# Patient Record
Sex: MALE | Race: WHITE | NOT HISPANIC OR LATINO | Employment: OTHER | URBAN - METROPOLITAN AREA
[De-identification: names, ages, dates, MRNs, and addresses within clinical notes are randomized per-mention and may not be internally consistent; named-entity substitution may affect disease eponyms.]

---

## 2019-03-11 ENCOUNTER — APPOINTMENT (OUTPATIENT)
Dept: GENERAL RADIOLOGY | Facility: HOSPITAL | Age: 74
End: 2019-03-11

## 2019-03-11 ENCOUNTER — APPOINTMENT (OUTPATIENT)
Dept: CT IMAGING | Facility: HOSPITAL | Age: 74
End: 2019-03-11

## 2019-03-11 ENCOUNTER — HOSPITAL ENCOUNTER (EMERGENCY)
Facility: HOSPITAL | Age: 74
Discharge: HOME | End: 2019-03-11
Attending: EMERGENCY MEDICINE | Admitting: EMERGENCY MEDICINE

## 2019-03-11 VITALS
DIASTOLIC BLOOD PRESSURE: 115 MMHG | BODY MASS INDEX: 27.77 KG/M2 | TEMPERATURE: 96.7 F | WEIGHT: 216.4 LBS | OXYGEN SATURATION: 95 % | HEART RATE: 76 BPM | SYSTOLIC BLOOD PRESSURE: 165 MMHG | RESPIRATION RATE: 20 BRPM | HEIGHT: 74 IN

## 2019-03-11 DIAGNOSIS — R09.1 PLEURISY: Primary | ICD-10-CM

## 2019-03-11 LAB
ALBUMIN SERPL-MCNC: 4.8 G/DL (ref 3.5–5.2)
ALBUMIN/GLOB SERPL: 1.5 G/DL
ALP SERPL-CCNC: 47 U/L (ref 39–117)
ALT SERPL W P-5'-P-CCNC: 23 U/L (ref 1–41)
ANION GAP SERPL CALCULATED.3IONS-SCNC: 13.4 MMOL/L
AST SERPL-CCNC: 16 U/L (ref 1–40)
BASOPHILS # BLD AUTO: 0.02 10*3/MM3 (ref 0–0.2)
BASOPHILS NFR BLD AUTO: 0.2 % (ref 0–1.5)
BILIRUB SERPL-MCNC: 1 MG/DL (ref 0.1–1.2)
BUN BLD-MCNC: 14 MG/DL (ref 8–23)
BUN/CREAT SERPL: 14.9 (ref 7–25)
CALCIUM SPEC-SCNC: 9.9 MG/DL (ref 8.6–10.5)
CHLORIDE SERPL-SCNC: 98 MMOL/L (ref 98–107)
CO2 SERPL-SCNC: 26.6 MMOL/L (ref 22–29)
CREAT BLD-MCNC: 0.94 MG/DL (ref 0.76–1.27)
DEPRECATED RDW RBC AUTO: 42 FL (ref 37–54)
EOSINOPHIL # BLD AUTO: 0.02 10*3/MM3 (ref 0–0.4)
EOSINOPHIL NFR BLD AUTO: 0.2 % (ref 0.3–6.2)
ERYTHROCYTE [DISTWIDTH] IN BLOOD BY AUTOMATED COUNT: 12.8 % (ref 12.3–15.4)
GFR SERPL CREATININE-BSD FRML MDRD: 78 ML/MIN/1.73
GFR SERPL CREATININE-BSD FRML MDRD: 95 ML/MIN/1.73
GLOBULIN UR ELPH-MCNC: 3.2 GM/DL
GLUCOSE BLD-MCNC: 126 MG/DL (ref 65–99)
HCT VFR BLD AUTO: 46 % (ref 37.5–51)
HGB BLD-MCNC: 15.3 G/DL (ref 13–17.7)
IMM GRANULOCYTES # BLD AUTO: 0.05 10*3/MM3 (ref 0–0.05)
IMM GRANULOCYTES NFR BLD AUTO: 0.4 % (ref 0–0.5)
LYMPHOCYTES # BLD AUTO: 1.82 10*3/MM3 (ref 0.7–3.1)
LYMPHOCYTES NFR BLD AUTO: 16 % (ref 19.6–45.3)
MCH RBC QN AUTO: 29.8 PG (ref 26.6–33)
MCHC RBC AUTO-ENTMCNC: 33.3 G/DL (ref 31.5–35.7)
MCV RBC AUTO: 89.5 FL (ref 79–97)
MONOCYTES # BLD AUTO: 0.66 10*3/MM3 (ref 0.1–0.9)
MONOCYTES NFR BLD AUTO: 5.8 % (ref 5–12)
NEUTROPHILS # BLD AUTO: 8.81 10*3/MM3 (ref 1.4–7)
NEUTROPHILS NFR BLD AUTO: 77.4 % (ref 42.7–76)
NRBC BLD AUTO-RTO: 0 /100 WBC (ref 0–0)
NT-PROBNP SERPL-MCNC: 472.1 PG/ML (ref 5–900)
PLATELET # BLD AUTO: 205 10*3/MM3 (ref 140–450)
PMV BLD AUTO: 9.2 FL (ref 6–12)
POTASSIUM BLD-SCNC: 4.4 MMOL/L (ref 3.5–5.2)
PROT SERPL-MCNC: 8 G/DL (ref 6–8.5)
RBC # BLD AUTO: 5.14 10*6/MM3 (ref 4.14–5.8)
SODIUM BLD-SCNC: 138 MMOL/L (ref 136–145)
TROPONIN T SERPL-MCNC: <0.01 NG/ML (ref 0–0.03)
WBC NRBC COR # BLD: 11.38 10*3/MM3 (ref 3.4–10.8)

## 2019-03-11 PROCEDURE — 84484 ASSAY OF TROPONIN QUANT: CPT | Performed by: EMERGENCY MEDICINE

## 2019-03-11 PROCEDURE — 71275 CT ANGIOGRAPHY CHEST: CPT

## 2019-03-11 PROCEDURE — 80053 COMPREHEN METABOLIC PANEL: CPT | Performed by: EMERGENCY MEDICINE

## 2019-03-11 PROCEDURE — 85025 COMPLETE CBC W/AUTO DIFF WBC: CPT | Performed by: EMERGENCY MEDICINE

## 2019-03-11 PROCEDURE — 0 IOPAMIDOL PER 1 ML: Performed by: NURSE PRACTITIONER

## 2019-03-11 PROCEDURE — 99282 EMERGENCY DEPT VISIT SF MDM: CPT

## 2019-03-11 PROCEDURE — 93005 ELECTROCARDIOGRAM TRACING: CPT | Performed by: EMERGENCY MEDICINE

## 2019-03-11 PROCEDURE — 93010 ELECTROCARDIOGRAM REPORT: CPT | Performed by: INTERNAL MEDICINE

## 2019-03-11 PROCEDURE — 93005 ELECTROCARDIOGRAM TRACING: CPT

## 2019-03-11 PROCEDURE — 83880 ASSAY OF NATRIURETIC PEPTIDE: CPT | Performed by: EMERGENCY MEDICINE

## 2019-03-11 PROCEDURE — 71046 X-RAY EXAM CHEST 2 VIEWS: CPT

## 2019-03-11 PROCEDURE — 36415 COLL VENOUS BLD VENIPUNCTURE: CPT

## 2019-03-11 RX ORDER — NAPROXEN 500 MG/1
500 TABLET ORAL 2 TIMES DAILY PRN
Qty: 14 TABLET | Refills: 0 | Status: SHIPPED | OUTPATIENT
Start: 2019-03-11

## 2019-03-11 RX ORDER — BENZONATATE 100 MG/1
100 CAPSULE ORAL 3 TIMES DAILY PRN
Qty: 21 CAPSULE | Refills: 0 | Status: SHIPPED | OUTPATIENT
Start: 2019-03-11

## 2019-03-11 RX ADMIN — IOPAMIDOL 95 ML: 755 INJECTION, SOLUTION INTRAVENOUS at 18:41

## 2019-03-11 NOTE — ED NOTES
Pt reports sharp pain in left chest/back with inspiration x 6 days ago.      Farideh Real, RN  03/11/19 5407

## 2019-03-11 NOTE — ED PROVIDER NOTES
"EMERGENCY DEPARTMENT ENCOUNTER    CHIEF COMPLAINT  Chief Complaint: Left sided back pain   History given by:pt  History limited by:none  Time Seen: 1756  Room Number: 29/29  PMD: Provider, No Known      HPI:  Pt is a 73 y.o. male who presents with left sided upper back pain on inspiration that started 6 days ago. Pt admits to productive cough with yellow sputum and subjective fever but denies chest pain, SOA, N/V/D, chills, or congestion.    Duration: six days  Timing: intermittent  Location:left sided upper back  Radiation:none  Quality:\"pain\"  Intensity/Severity:moderate  Progression:unchanged  Associated Symptoms:productive cough with yellow sputum, subjective fever  Aggravating Factors:none  Alleviating Factors:none  Previous Episodes:none  Treatment before arrival:none    PAST MEDICAL HISTORY  Active Ambulatory Problems     Diagnosis Date Noted   • No Active Ambulatory Problems     Resolved Ambulatory Problems     Diagnosis Date Noted   • No Resolved Ambulatory Problems     Past Medical History:   Diagnosis Date   • Hernia, abdominal        PAST SURGICAL HISTORY  History reviewed. No pertinent surgical history.    FAMILY HISTORY  History reviewed. No pertinent family history.    SOCIAL HISTORY  Social History     Socioeconomic History   • Marital status: Single     Spouse name: Not on file   • Number of children: Not on file   • Years of education: Not on file   • Highest education level: Not on file   Social Needs   • Financial resource strain: Not on file   • Food insecurity - worry: Not on file   • Food insecurity - inability: Not on file   • Transportation needs - medical: Not on file   • Transportation needs - non-medical: Not on file   Occupational History   • Not on file   Tobacco Use   • Smoking status: Never Smoker   Substance and Sexual Activity   • Alcohol use: No     Frequency: Never   • Drug use: No   • Sexual activity: Not on file   Other Topics Concern   • Not on file   Social History Narrative "   • Not on file         ALLERGIES  Patient has no known allergies.    REVIEW OF SYSTEMS  Review of Systems   Constitutional: Positive for fever (subjective). Negative for activity change, appetite change ( decreased) and chills.   HENT: Negative for congestion, ear pain, rhinorrhea, sinus pressure and sore throat.    Eyes: Negative.    Respiratory: Positive for cough (productive with yellow sputum). Negative for shortness of breath.    Cardiovascular: Negative.  Negative for chest pain, palpitations and leg swelling ( pedal).   Gastrointestinal: Negative for abdominal pain, diarrhea, nausea and vomiting.   Endocrine: Negative.    Genitourinary: Negative.  Negative for decreased urine volume, difficulty urinating, dysuria, frequency and urgency.   Musculoskeletal: Positive for back pain (left sided upper; on inspiration).   Skin: Negative.  Negative for rash.   Allergic/Immunologic: Negative.    Neurological: Negative.  Negative for dizziness, weakness, light-headedness, numbness and headaches.   Hematological: Negative.    Psychiatric/Behavioral: Negative.  The patient is not nervous/anxious.    All other systems reviewed and are negative.      PHYSICAL EXAM  ED Triage Vitals   Temp Heart Rate Resp BP SpO2   03/11/19 1438 03/11/19 1438 03/11/19 1438 03/11/19 1644 03/11/19 1438   96.7 °F (35.9 °C) 76 20 163/95 95 %      Temp src Heart Rate Source Patient Position BP Location FiO2 (%)   03/11/19 1438 -- 03/11/19 1644 03/11/19 1644 --   Tympanic  Sitting Left arm        Physical Exam   Constitutional: He is well-developed, well-nourished, and in no distress. No distress.   HENT:   Head: Normocephalic.   Mouth/Throat: Oropharynx is clear and moist and mucous membranes are normal.   Eyes: Pupils are equal, round, and reactive to light.   Neck: Normal range of motion.   Cardiovascular: Normal rate, regular rhythm and normal heart sounds.   Pulmonary/Chest: Effort normal and breath sounds normal. He has no wheezes.    Abdominal: Soft. Bowel sounds are normal. There is no tenderness.   Musculoskeletal: Normal range of motion. He exhibits no edema.   Neurological: He is alert.   Skin: Skin is warm and dry. No rash noted.   Psychiatric: Mood, memory, affect and judgment normal.   Nursing note and vitals reviewed.      LAB RESULTS  Recent Results (from the past 24 hour(s))   Comprehensive Metabolic Panel    Collection Time: 03/11/19  4:46 PM   Result Value Ref Range    Glucose 126 (H) 65 - 99 mg/dL    BUN 14 8 - 23 mg/dL    Creatinine 0.94 0.76 - 1.27 mg/dL    Sodium 138 136 - 145 mmol/L    Potassium 4.4 3.5 - 5.2 mmol/L    Chloride 98 98 - 107 mmol/L    CO2 26.6 22.0 - 29.0 mmol/L    Calcium 9.9 8.6 - 10.5 mg/dL    Total Protein 8.0 6.0 - 8.5 g/dL    Albumin 4.80 3.50 - 5.20 g/dL    ALT (SGPT) 23 1 - 41 U/L    AST (SGOT) 16 1 - 40 U/L    Alkaline Phosphatase 47 39 - 117 U/L    Total Bilirubin 1.0 0.1 - 1.2 mg/dL    eGFR Non African Amer 78 >60 mL/min/1.73    eGFR  African Amer 95 >60 mL/min/1.73    Globulin 3.2 gm/dL    A/G Ratio 1.5 g/dL    BUN/Creatinine Ratio 14.9 7.0 - 25.0    Anion Gap 13.4 mmol/L   BNP    Collection Time: 03/11/19  4:46 PM   Result Value Ref Range    proBNP 472.1 5.0 - 900.0 pg/mL   Troponin    Collection Time: 03/11/19  4:46 PM   Result Value Ref Range    Troponin T <0.010 0.000 - 0.030 ng/mL   CBC Auto Differential    Collection Time: 03/11/19  4:46 PM   Result Value Ref Range    WBC 11.38 (H) 3.40 - 10.80 10*3/mm3    RBC 5.14 4.14 - 5.80 10*6/mm3    Hemoglobin 15.3 13.0 - 17.7 g/dL    Hematocrit 46.0 37.5 - 51.0 %    MCV 89.5 79.0 - 97.0 fL    MCH 29.8 26.6 - 33.0 pg    MCHC 33.3 31.5 - 35.7 g/dL    RDW 12.8 12.3 - 15.4 %    RDW-SD 42.0 37.0 - 54.0 fl    MPV 9.2 6.0 - 12.0 fL    Platelets 205 140 - 450 10*3/mm3    Neutrophil % 77.4 (H) 42.7 - 76.0 %    Lymphocyte % 16.0 (L) 19.6 - 45.3 %    Monocyte % 5.8 5.0 - 12.0 %    Eosinophil % 0.2 (L) 0.3 - 6.2 %    Basophil % 0.2 0.0 - 1.5 %    Immature Grans %  "0.4 0.0 - 0.5 %    Neutrophils, Absolute 8.81 (H) 1.40 - 7.00 10*3/mm3    Lymphocytes, Absolute 1.82 0.70 - 3.10 10*3/mm3    Monocytes, Absolute 0.66 0.10 - 0.90 10*3/mm3    Eosinophils, Absolute 0.02 0.00 - 0.40 10*3/mm3    Basophils, Absolute 0.02 0.00 - 0.20 10*3/mm3    Immature Grans, Absolute 0.05 0.00 - 0.05 10*3/mm3    nRBC 0.0 0.0 - 0.0 /100 WBC       I ordered the above labs and reviewed the results    RADIOLOGY  CT Angiogram Chest With Contrast   Final Result       No pulmonary embolism. Follow-up as indications persist.       This report was finalized on 3/11/2019 7:26 PM by Dr. Nitish Martinez M.D.          XR Chest 2 View   IMPRESSIONS: No evidence of acute disease within the chest.          I ordered the above noted radiological studies and reviewed the images on the PACS system.  Spoke with Dr. Martinez regarding CT scan results        EKG    ekg was interpreted by Dr. Cevallos.      PROGRESS AND CONSULTS  2018  Reviewed pt's history and workup with Dr. Cevallos.  After a bedside evaluation; Dr Cevallos agrees with the plan of care    2219  Placed call to pt's home phone. Discussed with the pt that afib was seen on the monitor while the pt was in the ED. Discussed that the pt will need to follow up with cardiology tomorrow. Gave the pt the phone number to contact cardiology tomorrow morning.     COURSE & MEDICAL DECISION MAKING  Pertinent Labs and Imaging studies that were ordered and reviewed are noted above.  Results were reviewed/discussed with the patient and they were also made aware of online assess.   Pt also made aware that some labs, such as cultures, will not be resulted during ER visit and follow up with PMD is necessary.     MEDICATIONS GIVEN IN ER  Medications   iopamidol (ISOVUE-370) 76 % injection 100 mL (95 mL Intravenous Given by Other 3/11/19 1841)       BP (!) 165/115   Pulse 76   Temp 96.7 °F (35.9 °C) (Tympanic)   Resp 20   Ht 188 cm (74\")   Wt 98.2 kg (216 lb 6.4 oz)   " SpO2 95%   BMI 27.78 kg/m²     Discussed all results and noted any abnormalities with patient.     Reviewed implications of results, diagnosis, meds, responsibility to follow up, warning signs and symptoms of possible worsening, potential complications and reasons to return to ER with patient    Discussed plan for discharge, as there is no emergent indication for admission.  Pt is agreeable and understands need for follow up and repeat testing.  Pt is aware that discharge does not mean that nothing is wrong but it indicates no emergency is present and they must continue care with PCP.  Pt is discharged with instructions to follow up with primary care doctor to have their blood pressure rechecked.       DIAGNOSIS  Final diagnoses:   Pleurisy       FOLLOW UP   [facility] [address]  [phone]  Schedule an appointment as soon as possible for a visit         RX     Medication List      New Prescriptions    benzonatate 100 MG capsule  Commonly known as:  TESSALON  Take 1 capsule by mouth 3 (Three) Times a Day As Needed for Cough.     naproxen 500 MG tablet  Commonly known as:  NAPROSYN  Take 1 tablet by mouth 2 (Two) Times a Day As Needed for Moderate Pain .          I personally reviewed the past medical history, past surgical history, social history, family history, current medications and allergies as they appear in this chart.  The scribe's note accurately reflects the work and decisions made by me.           Documentation assistance provided by brett Treadwell for ROXI Peterson.  Information recorded by the scribe was done at my direction and has been verified and validated by me.         Nori Treadwell  03/11/19 2220       Christine Curran APRN  03/11/19 0479

## 2019-03-12 NOTE — ED PROVIDER NOTES
Pt is a 73 y.o. male who presents to the ED complaining of sharp pain in left chest with deep inspiration for the past 6 days.    On exam,  Constitutional: NAD  Cardiovascular: heart is RRR  Pulmonary: lungs CTAB  Abdomen: soft, non-tender, non-distended    Labs and imaging reviewed.   CTA Chest negative acute. BNP-472; troponin<0.01    EKG          EKG time: 1443  Rhythm/Rate: a-fib, 130  P waves and IN: afib  QRS, axis: Nml   ST and T waves: repol changes in diffuse leads     Interpreted Contemporaneously by me, independently viewed  No prior      Plan: Discharge with Rx for Tessalon and Naproxen    ED Course as of Mar 11 2203   Mon Mar 11, 2019   2145 9:46 PM  EKG woth atrial fibrillation (HR improved prior to DC).  CHADS-VASC = 1.  APRN to call patient and inform of need for cardiology follow up.  [WC]      ED Course User Index  [WC] Jame Cevallos MD MD ATTESTATION NOTE    The JOSE DAVID and I have discussed this patient's history, physical exam, and treatment plan.  I have reviewed the documentation and personally had a face to face interaction with the patient. I affirm the documentation and agree with the treatment and plan.  The attached note describes my personal findings.      Documentation assistance provided by brett Wyatt for Dr. Cevallos. Information recorded by the brett was done at my direction and has been verified and validated by me.                    Kishan Wyatt  03/11/19 2203       Jame Cevallos MD  03/11/19 3622

## 2023-03-27 ENCOUNTER — OFFICE VISIT (OUTPATIENT)
Dept: INTERNAL MEDICINE | Facility: CLINIC | Age: 78
End: 2023-03-27
Payer: MEDICARE

## 2023-03-27 VITALS
SYSTOLIC BLOOD PRESSURE: 140 MMHG | RESPIRATION RATE: 16 BRPM | OXYGEN SATURATION: 97 % | BODY MASS INDEX: 25.8 KG/M2 | HEIGHT: 74 IN | DIASTOLIC BLOOD PRESSURE: 90 MMHG | TEMPERATURE: 97.5 F | WEIGHT: 201 LBS | HEART RATE: 120 BPM

## 2023-03-27 DIAGNOSIS — Z00.00 HEALTHCARE MAINTENANCE: ICD-10-CM

## 2023-03-27 DIAGNOSIS — T39.91XA: ICD-10-CM

## 2023-03-27 DIAGNOSIS — R03.0 ELEVATED BP WITHOUT DIAGNOSIS OF HYPERTENSION: ICD-10-CM

## 2023-03-27 DIAGNOSIS — F22 PARANOIA: Primary | ICD-10-CM

## 2023-03-27 NOTE — PROGRESS NOTES
"Chief Complaint  Establish Care (Suspects he's been poisoned from home being compromised. )    Subjective          Johnson Wise presents to [facility]  History of Present Illness     The patient presents at today's office visit as a new patient.    The patient would like to get tested to see if he has been poisoned. He called poison control and they took his name and told him we could call them to find out what tests need to be done.    A couple of years ago, he was sitting in his living room watching TV and his feet felt like someone had poured scalding hot water on his feet. It did not happen again. He saw children on the news who had been poisoned by thallium and had the same symptoms.    He fortified the locks on his home, but believes at least one of them has been compromised. He believes that someone is poisoning him. He feels \"woozy,\" and his gums bleed a little bit. He states he is \"quite sure he has been poisoned.\" Today he received mail stating that he needed to go to a doctor and will pay him $ 50. He thinks he is poisoned by rat poison. He is installing cameras at his home. The patient states they will not get in again.    When informed that his blood pressure is elevated today, the patient states \"It was perfect. That's...what I'm saying, that's that poison.\"    Several years ago, he slipped in the tub on 2 pieces of soap and fractured a rib. Lately, it feels like someone stuck a thorn in his side. He states he needs an MRI.    Review of Systems was performed, and pertinent findings are noted in the HPI.    Objective   Vital Signs:   /90   Pulse 120   Temp 97.5 °F (36.4 °C)   Resp 16   Ht 188 cm (74\")   Wt 91.2 kg (201 lb)   SpO2 97%   BMI 25.81 kg/m²     Physical Exam  Vitals and nursing note reviewed.   Constitutional:       Appearance: He is well-developed.   HENT:      Head: Normocephalic and atraumatic.   Musculoskeletal:      Cervical back: Normal " "range of motion and neck supple.   Neurological:      Mental Status: He is alert and oriented to person, place, and time.   Psychiatric:         Behavior: Behavior normal.        Result Review :                 Assessment and Plan    Diagnoses and all orders for this visit:    1. Paranoia (Primary)  -     Ambulatory Referral to Occupational Medicine    2. Accidental poisoning by non-narcotic analgesic, initial encounter  -     Comprehensive Metabolic Panel  -     CBC & Differential  -     Ambulatory Referral to Occupational Medicine    3. Elevated BP without diagnosis of hypertension    4. Healthcare maintenance    1.Elevated blood pressure  - CBC and CMP ordered.    2. Possible poisoning  - I will refer him to occupational medicine. Patient is \"pretty sure\" that this is not in his \"head.\"  He is not entertaining that he may have some underlying paranoia.     3. Rib pain  - We will consider imaging in the future.        Follow Up   No follow-ups on file.   Follow up in 2 months.    Patient was given instructions and counseling regarding his condition or for health maintenance advice. Please see specific information pulled into the AVS if appropriate.         Transcribed from ambient dictation for Larry Sousa MD by Martina Davila.  03/27/23   13:29 EDT    Patient or patient representative verbalized consent to the visit recording.  I have personally performed the services described in this document as transcribed by the above individual, and it is both accurate and complete.          "

## 2023-03-30 ENCOUNTER — TELEPHONE (OUTPATIENT)
Dept: INTERNAL MEDICINE | Facility: CLINIC | Age: 78
End: 2023-03-30

## 2023-03-30 NOTE — TELEPHONE ENCOUNTER
Caller: Johnson Wise    Relationship to patient: Self    Best call back number: [phone]    Patient is needing: PATIENT STATES THAT HE SAW DR. KUNZ FOR HIS THINKING THAT HE HAD POISON/CARCINOGEN IN HIS BODY.   HE STATES HE WAS TAKING A HEADACHE MEDICINE THAT HAD CAFFEINE IN IT.   HE STATES HE IS NOT IN CONTACT WITH DRUG DEALERS, BUT THAT HE CAN POSSIBLY LOCATE PENICILLIN ON HIS OWN, AND PATIENT IS REQUESTING THAT WOULD LIKE DR. KUNZ TO PRESCRIBE HIM PENICILLIN BECAUSE HE NOW THINKS YOU HAVE SYPHYLLIS AND IT IS AFFECTING HIS MUSCULAR SYSTEM, BUT THEN AGAIN, IT MIGHT BE THE POISON.  PATIENT WOULD LIKE DR. KUNZ'S THOUGHTS ON GIVING HIM AN INJECTION OF [id number] UNITS OF BENADICTINE PENICILLIN.    PLEASE CALL THE PATIENT TO ADVISE.       PATIENT IS ALSO WAITING ON DR. KUNZ TO GET THE POISON TEST.

## 2023-03-31 ENCOUNTER — PATIENT ROUNDING (BHMG ONLY) (OUTPATIENT)
Dept: INTERNAL MEDICINE | Facility: CLINIC | Age: 78
End: 2023-03-31
Payer: MEDICARE

## 2023-03-31 NOTE — PROGRESS NOTES
March 31, 2023    Hello, may I speak with Johnson Wise?    My name is  JACOBY    I am  with [facility]  [address]  [place] [address] [phone].    Before we get started may I verify your date of birth? 1945    I am calling to officially welcome you to our practice and ask about your recent visit. Is this a good time to talk? YES  Tell me about your visit with us. What things went well?  YES       We're always looking for ways to make our patients' experiences even better. Do you have recommendations on ways we may improve?  NO    Overall were you satisfied with your first visit to our practice? YES     I appreciate you taking the time to speak with me today. Is there anything else I can do for you? NO      Thank you, and have a great day.

## 2023-04-05 NOTE — TELEPHONE ENCOUNTER
PATIENT CALLING BACK IN REGARDS TO POISON IN HIS BODY. HE HAS NOT HEARD ANYTHING ABOUT WHERE TO GO     PLEASE CALL AND ADVISE [phone]    PLEASE LEAVE A VOICEMAIL IF NO ANSWER

## 2023-04-12 ENCOUNTER — TELEPHONE (OUTPATIENT)
Dept: INTERNAL MEDICINE | Facility: CLINIC | Age: 78
End: 2023-04-12

## 2023-04-12 DIAGNOSIS — Z72.51 HIGH RISK HETEROSEXUAL BEHAVIOR: Primary | ICD-10-CM

## 2023-04-12 NOTE — TELEPHONE ENCOUNTER
I have ordered a syphilis RPR.  He will need to get this done in order to see if he has syphilis.  If he does have syphilis then I can give him the antibiotics.

## 2023-04-12 NOTE — TELEPHONE ENCOUNTER
Telephone with Larry Sousa MD (03/30/2023)    TELEPHONE ENCOUNTER ON 3/30/23    Caller: Johnson Wise     Relationship to patient: Self     Best call back number:      Patient is needing: PATIENT STATES THAT HE SAW DR. SOUSA FOR HIS THINKING THAT HE HAD POISON/CARCINOGEN IN HIS BODY.   HE STATES HE WAS TAKING A HEADACHE MEDICINE THAT HAD CAFFEINE IN IT.   HE STATES HE IS NOT IN CONTACT WITH DRUG DEALERS, BUT THAT HE CAN POSSIBLY LOCATE PENICILLIN ON HIS OWN, AND PATIENT IS REQUESTING THAT WOULD LIKE DR. SOUSA TO PRESCRIBE HIM PENICILLIN BECAUSE HE NOW THINKS YOU HAVE SYPHYLLIS AND IT IS AFFECTING HIS MUSCULAR SYSTEM, BUT THEN AGAIN, IT MIGHT BE THE POISON.  PATIENT WOULD LIKE DR. SOUSA'S THOUGHTS ON GIVING HIM AN INJECTION OF [id number] UNITS OF BENADICTINE PENICILLIN.    PLEASE CALL THE PATIENT TO ADVISE.        PATIENT IS ALSO WAITING ON DR. SOUSA TO GET THE POISON TEST.

## 2023-04-13 ENCOUNTER — TELEPHONE (OUTPATIENT)
Dept: INTERNAL MEDICINE | Facility: CLINIC | Age: 78
End: 2023-04-13

## 2023-04-13 NOTE — TELEPHONE ENCOUNTER
Spoke to patient and told her she does not need to schedule labs she can walk in.      HUB TO READ

## 2023-04-13 NOTE — TELEPHONE ENCOUNTER
Hub staff attempted to follow warm transfer process and was unsuccessful     Caller: STEVIE    Relationship to patient: Other    Best call back number: [phone]    Patient is needing: STEVIE CALLED IN TO HELP THE PATIENT SCHEDULE A LAB APPOINTMENT. PLEASE ADVISE.

## 2023-05-19 ENCOUNTER — LAB (OUTPATIENT)
Dept: LAB | Facility: HOSPITAL | Age: 78
End: 2023-05-19
Payer: MEDICARE

## 2023-05-19 LAB
ALBUMIN SERPL-MCNC: 4.5 G/DL (ref 3.5–5.2)
ALBUMIN/GLOB SERPL: 1.7 G/DL
ALP SERPL-CCNC: 42 U/L (ref 39–117)
ALT SERPL W P-5'-P-CCNC: 15 U/L (ref 1–41)
ANION GAP SERPL CALCULATED.3IONS-SCNC: 7.3 MMOL/L (ref 5–15)
AST SERPL-CCNC: 16 U/L (ref 1–40)
BASOPHILS # BLD AUTO: 0.03 10*3/MM3 (ref 0–0.2)
BASOPHILS NFR BLD AUTO: 0.4 % (ref 0–1.5)
BILIRUB SERPL-MCNC: 0.4 MG/DL (ref 0–1.2)
BUN SERPL-MCNC: 19 MG/DL (ref 8–23)
BUN/CREAT SERPL: 21.3 (ref 7–25)
CALCIUM SPEC-SCNC: 9.8 MG/DL (ref 8.6–10.5)
CHLORIDE SERPL-SCNC: 102 MMOL/L (ref 98–107)
CO2 SERPL-SCNC: 28.7 MMOL/L (ref 22–29)
CREAT SERPL-MCNC: 0.89 MG/DL (ref 0.76–1.27)
DEPRECATED RDW RBC AUTO: 42.8 FL (ref 37–54)
EGFRCR SERPLBLD CKD-EPI 2021: 87.2 ML/MIN/1.73
EOSINOPHIL # BLD AUTO: 0.15 10*3/MM3 (ref 0–0.4)
EOSINOPHIL NFR BLD AUTO: 1.9 % (ref 0.3–6.2)
ERYTHROCYTE [DISTWIDTH] IN BLOOD BY AUTOMATED COUNT: 13.2 % (ref 12.3–15.4)
GLOBULIN UR ELPH-MCNC: 2.7 GM/DL
GLUCOSE SERPL-MCNC: 117 MG/DL (ref 65–99)
HCT VFR BLD AUTO: 41.9 % (ref 37.5–51)
HGB BLD-MCNC: 14.3 G/DL (ref 13–17.7)
IMM GRANULOCYTES # BLD AUTO: 0.01 10*3/MM3 (ref 0–0.05)
IMM GRANULOCYTES NFR BLD AUTO: 0.1 % (ref 0–0.5)
LYMPHOCYTES # BLD AUTO: 3.07 10*3/MM3 (ref 0.7–3.1)
LYMPHOCYTES NFR BLD AUTO: 39.2 % (ref 19.6–45.3)
MCH RBC QN AUTO: 30.6 PG (ref 26.6–33)
MCHC RBC AUTO-ENTMCNC: 34.1 G/DL (ref 31.5–35.7)
MCV RBC AUTO: 89.7 FL (ref 79–97)
MONOCYTES # BLD AUTO: 0.61 10*3/MM3 (ref 0.1–0.9)
MONOCYTES NFR BLD AUTO: 7.8 % (ref 5–12)
NEUTROPHILS NFR BLD AUTO: 3.96 10*3/MM3 (ref 1.7–7)
NEUTROPHILS NFR BLD AUTO: 50.6 % (ref 42.7–76)
NRBC BLD AUTO-RTO: 0 /100 WBC (ref 0–0.2)
PLATELET # BLD AUTO: 217 10*3/MM3 (ref 140–450)
PMV BLD AUTO: 9.9 FL (ref 6–12)
POTASSIUM SERPL-SCNC: 4.8 MMOL/L (ref 3.5–5.2)
PROT SERPL-MCNC: 7.2 G/DL (ref 6–8.5)
RBC # BLD AUTO: 4.67 10*6/MM3 (ref 4.14–5.8)
SODIUM SERPL-SCNC: 138 MMOL/L (ref 136–145)
WBC NRBC COR # BLD: 7.83 10*3/MM3 (ref 3.4–10.8)

## 2023-05-19 PROCEDURE — 85025 COMPLETE CBC W/AUTO DIFF WBC: CPT | Performed by: FAMILY MEDICINE

## 2023-05-19 PROCEDURE — 86592 SYPHILIS TEST NON-TREP QUAL: CPT | Performed by: FAMILY MEDICINE

## 2023-05-19 PROCEDURE — 80053 COMPREHEN METABOLIC PANEL: CPT | Performed by: FAMILY MEDICINE

## 2023-05-22 ENCOUNTER — TELEPHONE (OUTPATIENT)
Dept: INTERNAL MEDICINE | Facility: CLINIC | Age: 78
End: 2023-05-22
Payer: MEDICARE

## 2023-05-22 DIAGNOSIS — K42.9 UMBILICAL HERNIA WITHOUT OBSTRUCTION AND WITHOUT GANGRENE: Primary | ICD-10-CM

## 2023-05-22 NOTE — TELEPHONE ENCOUNTER
Caller: Johnson Wise    Relationship: Self    Best call back number: [phone]    What orders are you requesting (i.e. lab or imaging): LABS     In what timeframe would the patient need to come in: ASAP    Where will you receive your lab/imaging services: IN-OFFICE    Additional notes: THE PATIENT RECENTLY HAD LABS DONE ON 05/19/2023. THE PATIENT WANTED DR. KUNZ TO ADD ORDERS TO CHECK FOR BLOOD THINNING AND ANY INFECTIONS, SUCH AS STAPH, THAT MAY BE PRESENT.

## 2023-05-22 NOTE — TELEPHONE ENCOUNTER
Left VM for pt to call back.  Not sure why he wants more blood work, or why he thinks he has a staph infection but that type of blood work will have to be drawn it cannot be added to existing orders due to the tubes it must be drawn into.

## 2023-05-24 NOTE — TELEPHONE ENCOUNTER
Pt returned my call.  He reports that he thought he ingested rat poison which is why he was curious about a staph infection.  I informed him that his CBC and CMP were both normal. He was satisfied with these results and does not want more testing at this time, but he does want a referral to a general surgeon for hernia repair.  Referral placed.

## 2023-06-06 ENCOUNTER — TELEPHONE (OUTPATIENT)
Dept: SURGERY | Facility: CLINIC | Age: 78
End: 2023-06-06
Payer: MEDICARE

## 2023-06-07 ENCOUNTER — TELEPHONE (OUTPATIENT)
Dept: INTERNAL MEDICINE | Facility: CLINIC | Age: 78
End: 2023-06-07

## 2023-06-07 NOTE — TELEPHONE ENCOUNTER
PATIENT CALLED TO HAVE HIS BLOOD THICKNESS CHECKED. THIS IS FROM THE PREVIOUS POISONING APPOINTMENT ON 3/27/23.    ALSO WANTS TO CHECK TO SEE IF HE HAS A STAFF INFECTION    PLEASE CALL [phone]

## 2023-06-09 ENCOUNTER — TELEPHONE (OUTPATIENT)
Dept: SURGERY | Facility: CLINIC | Age: 78
End: 2023-06-09
Payer: MEDICARE

## 2023-06-09 NOTE — TELEPHONE ENCOUNTER
SPOKE TO PT REGARDING DR SOL'S RESPONSE AS WHETHER SHE CAN TIE HIS HERNIA UP. EXPLAINED THAT SHE'D EXAMINE HIM IN THE OFFICE & DISCUSS WAYS TO FIX THE HERNIA BUT AT THIS TIME SHE'S NOT ABLE TO COMMENT MUCH MORE UNTIL SHE SEES HIM IN THE OFFICE. PT STATED THAT HE'D THINK ABOUT WHETHER HE WANTS TO KEEP THE APPT & CALL US EITHER MONDAY OR TUESDAY.

## 2023-06-09 NOTE — TELEPHONE ENCOUNTER
**Dr. Carlson already responded to Hope regarding this matter, and Hope called and spoke with Patient.    Patient calling again to know if you will tie up hernia instead of using mesh? He does not want to waste an office visit seeing you if you would not tie it up.

## 2023-08-15 ENCOUNTER — TELEPHONE (OUTPATIENT)
Dept: INTERNAL MEDICINE | Facility: CLINIC | Age: 78
End: 2023-08-15
Payer: MEDICARE

## 2023-08-15 DIAGNOSIS — T39.91XA: Primary | ICD-10-CM

## 2023-08-15 NOTE — TELEPHONE ENCOUNTER
Caller: Johnson Wise    Relationship: Self    Best call back number: [phone]    What orders are you requesting (i.e. lab or imaging): BRUCELLOSIS TEST     In what timeframe would the patient need to come in: ASAP     Where will you receive your lab/imaging services: OFFICE    Additional notes: PATIENT STATES THAT HE DOESN'T KNOW IF DR. KUNZ CAN DO THIS CULTURE. PATIENT STATES THAT HE REALLY NEEDS TO HAVE THIS DONE. PLEASE ADVISE PATIENT AND CALL HIM ASAP.

## 2023-08-21 ENCOUNTER — TELEPHONE (OUTPATIENT)
Dept: INTERNAL MEDICINE | Facility: CLINIC | Age: 78
End: 2023-08-21
Payer: MEDICARE

## 2023-08-25 ENCOUNTER — OFFICE VISIT (OUTPATIENT)
Dept: INTERNAL MEDICINE | Facility: CLINIC | Age: 78
End: 2023-08-25
Payer: MEDICARE

## 2023-08-25 VITALS
SYSTOLIC BLOOD PRESSURE: 156 MMHG | WEIGHT: 197.9 LBS | HEART RATE: 76 BPM | BODY MASS INDEX: 25.4 KG/M2 | DIASTOLIC BLOOD PRESSURE: 100 MMHG | OXYGEN SATURATION: 97 % | HEIGHT: 74 IN

## 2023-08-25 DIAGNOSIS — R68.89: Primary | ICD-10-CM

## 2023-08-25 PROCEDURE — 1160F RVW MEDS BY RX/DR IN RCRD: CPT | Performed by: FAMILY MEDICINE

## 2023-08-25 PROCEDURE — 99213 OFFICE O/P EST LOW 20 MIN: CPT | Performed by: FAMILY MEDICINE

## 2023-08-25 PROCEDURE — 1159F MED LIST DOCD IN RCRD: CPT | Performed by: FAMILY MEDICINE

## 2023-08-25 NOTE — PROGRESS NOTES
"Chief Complaint  GI Problem (Would like a referral )    Subjective          Johnson Wise presents to [facility]  History of Present Illness    Mr. Wise is a 78-year-old male who presents today for GI issues.    The patient was referred to infectious disease at his last visit. He believes it is brucellosis and he was exposed by drinking bad milk, meat, and dairy products.    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 He went to immediate care for his foot and was told he had a staph infection. He was given a sulfa drug, but it was not working. He was then given amoxicillin, and he felt wonderful. He kept hitting his foot, and it did not hurt. The pain went down, but it did not go all the way. It is indicative of the reaction the most penicillin will have to brucellosis. He states they tried penicillin, and it was a wash out. They used penicillin, and it takes a \"double whammy.\"  He would like to get treated by infectious disease.  As he believes that the treatment takes 6 to 8 weeks.    Objective   Vital Signs:   /100   Pulse 76   Ht 188 cm (74.02\")   Wt 89.8 kg (197 lb 14.4 oz)   SpO2 97%   BMI 25.40 kg/mý     A review of systems was performed, and the pertinent positives are noted in the HPI.      Physical Exam  Vitals and nursing note reviewed.   Constitutional:       Appearance: He is well-developed.   HENT:      Head: Normocephalic and atraumatic.   Musculoskeletal:      Cervical back: Normal range of motion and neck supple.   Neurological:      Mental Status: He is alert and oriented to person, place, and time.   Psychiatric:         Behavior: Behavior normal.      Result Review :                 Assessment and Plan    Diagnoses and all orders for this visit:    1. 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 Suspected brucellosis  - Referral to infectious disease.      Follow Up   No follow-ups on file.  Patient was " given instructions and counseling regarding his condition or for health maintenance advice. Please see specific information pulled into the AVS if appropriate.         Transcribed from ambient dictation for Larry Sousa MD by Ana Moore.  08/25/23   12:11 EDT    Patient or patient representative verbalized consent to the visit recording.  I have personally performed the services described in this document as transcribed by the above individual, and it is both accurate and complete.

## 2023-08-28 ENCOUNTER — TELEPHONE (OUTPATIENT)
Dept: INTERNAL MEDICINE | Facility: CLINIC | Age: 78
End: 2023-08-28

## 2023-08-28 ENCOUNTER — TELEPHONE (OUTPATIENT)
Dept: INTERNAL MEDICINE | Facility: CLINIC | Age: 78
End: 2023-08-28
Payer: MEDICARE

## 2023-08-28 NOTE — TELEPHONE ENCOUNTER
Hub staff attempted to follow warm transfer process and was unsuccessful     Caller: Johnson Wise    Relationship to patient: Self    Best call back number: [phone]    Patient is needing: PATIENT NEEDS A TEST FOR HIS VIRAL INFECTION.PLEASE CALL

## 2023-08-28 NOTE — TELEPHONE ENCOUNTER
Comments    Please be advised that the referral for this patient has been reviewed and for now is denied.  There is no evidence of infection at this time.  Would recommend PCP provide testing for Brucella. If positive, we would be happy to see and treat him.

## 2023-08-29 ENCOUNTER — TELEPHONE (OUTPATIENT)
Dept: INTERNAL MEDICINE | Facility: CLINIC | Age: 78
End: 2023-08-29
Payer: MEDICARE

## 2023-08-29 DIAGNOSIS — R68.89: Primary | ICD-10-CM

## 2023-08-29 DIAGNOSIS — F22 PARANOIA: ICD-10-CM

## 2023-08-29 DIAGNOSIS — J30.9 ALLERGIC RHINITIS, UNSPECIFIED SEASONALITY, UNSPECIFIED TRIGGER: ICD-10-CM

## 2023-08-29 NOTE — TELEPHONE ENCOUNTER
PATIENT CALLED AGAIN IN REGARDS TO A TEST FOR BURCELLA. HE IS SHOWING ALL THE SYMPTOMS PER NURSE AT INSURANCE.    PLEASE CALL AND ADVISE [phone]

## 2023-08-30 NOTE — TELEPHONE ENCOUNTER
Per Dr. Sousa order Brucella IGG and IGM, eosinophil count for pt to be performed in [facility] [address] at [facility].  Tests have been ordered. Left detailed message for pt on .

## 2023-08-30 NOTE — TELEPHONE ENCOUNTER
"READ \"HUB TO READ\"    PATIENT SAID NO.    HE SAID THAT HE NEEDS AN ORDER TO TEST FOR \"BRUCELLOSIS\"      PLEASE CALL TO DISCUSS WITH PATIENT.  "

## 2023-08-30 NOTE — TELEPHONE ENCOUNTER
PATIENT IS CALLING BACK.  HE STATES HE WANTS TO KNOW IF THE LABS ARE A GUARANTEE TO SEE IF HE HAS THE BRUCELLA.    HE IS REQUESTING A RETURN CALL     PATIENT CALL BACK [phone]    PLEASE ADVISE.

## 2023-08-30 NOTE — TELEPHONE ENCOUNTER
I spoke to Denisse about this pt this morning you can forward this to me and I will call him back.

## 2023-09-01 ENCOUNTER — LAB (OUTPATIENT)
Dept: LAB | Facility: HOSPITAL | Age: 78
End: 2023-09-01
Payer: MEDICARE

## 2023-09-01 DIAGNOSIS — R68.89: ICD-10-CM

## 2023-09-01 DIAGNOSIS — J30.9 ALLERGIC RHINITIS, UNSPECIFIED SEASONALITY, UNSPECIFIED TRIGGER: ICD-10-CM

## 2023-09-01 LAB
EOSINOPHIL # BLD AUTO: 0.26 10*3/MM3 (ref 0–0.4)
EOSINOPHIL NFR BLD AUTO: 4.1 % (ref 0.3–6.2)

## 2023-09-01 PROCEDURE — 86622 BRUCELLA ANTIBODY: CPT

## 2023-09-01 PROCEDURE — 87040 BLOOD CULTURE FOR BACTERIA: CPT

## 2023-09-01 PROCEDURE — 85048 AUTOMATED LEUKOCYTE COUNT: CPT

## 2023-09-01 PROCEDURE — 36415 COLL VENOUS BLD VENIPUNCTURE: CPT

## 2023-09-05 ENCOUNTER — TELEPHONE (OUTPATIENT)
Dept: INTERNAL MEDICINE | Facility: CLINIC | Age: 78
End: 2023-09-05

## 2023-09-05 LAB
BRUCELLA IGG SER QL IA: NEGATIVE
BRUCELLA IGM SER QL IA: NEGATIVE

## 2023-09-05 NOTE — TELEPHONE ENCOUNTER
Caller: Johnson Wise    Relationship: Self    Best call back number: [phone]     What is the best time to reach you: ANYTIME    Who are you requesting to speak with (clinical staff, provider,  specific staff member): UNKNOWN    Do you know the name of the person who called: UNKNOWN    What was the call regarding: PATIENT CALLING STATING THAT HE HAD A MISSED CALL NO MESSAGE WAS LEFT     Is it okay if the provider responds through [facility]: NO        
Hub can read:  Pt cannot be reached by office due to calling restrictions on his phone.  His labs are normal.  He does NOT have burcellosis.  
PATIENT CALLED AND SHARED INFO, HE STATES HE HAS ALL THE SYMPTOMS. HIS LEGS HURT    HE WILL MAKE APPOINTMENT WITH DR. KUNZ  
no

## 2023-09-06 LAB — BACTERIA SPEC AEROBE CULT: NORMAL

## 2023-09-08 ENCOUNTER — TELEPHONE (OUTPATIENT)
Dept: INTERNAL MEDICINE | Facility: CLINIC | Age: 78
End: 2023-09-08

## 2023-09-08 NOTE — TELEPHONE ENCOUNTER
Hub staff attempted to follow warm transfer process and was unsuccessful     Caller: Johnson Wise    Relationship to patient: Self    Best call back number: [phone]    Patient is needing: PATIENT IS NEEDING A CALLBACK TO DISCUSS SOME CONCERNS ABOUT HIS LEG PAIN, SWEATS, HEADACHE, JOINT PAIN.  .     PLEASE CONTACT PATIENT TO ADVISE.    THANKS

## 2023-09-08 NOTE — TELEPHONE ENCOUNTER
Caller: Johnson Wise    Relationship to patient: Self    Best call back number: [phone]     Chief complaint: LEG PAIN    Type of visit: OFFICE    Requested date: ASAP    If rescheduling, when is the original appointment:     Additional notes:PATIENT CALLING WANTING TO KNOW IF HE CAN BE WORKED IN AGAIN HE TRIED TO USE THE CAB SERVICE THEY CAM TO LATE TO GET HIM THERE

## 2023-09-08 NOTE — TELEPHONE ENCOUNTER
Caller: Johnson Wise    Relationship to patient: Self    Best call back number: [phone]     Chief complaint: SCHEDULING    Type of visit: OFFICE    Requested date: ASAP     If rescheduling, when is the original appointment: 09/15/2023     Additional notes: PATIENT IS REQUESTING A SOONER APPOINTMENT.  UNABLE TO WARM TRANSFER.     PLEASE ADVISE.

## 2023-09-15 ENCOUNTER — TELEPHONE (OUTPATIENT)
Dept: INTERNAL MEDICINE | Facility: CLINIC | Age: 78
End: 2023-09-15

## 2023-09-15 ENCOUNTER — OFFICE VISIT (OUTPATIENT)
Dept: INTERNAL MEDICINE | Facility: CLINIC | Age: 78
End: 2023-09-15
Payer: MEDICARE

## 2023-09-15 VITALS
RESPIRATION RATE: 16 BRPM | SYSTOLIC BLOOD PRESSURE: 172 MMHG | HEART RATE: 175 BPM | OXYGEN SATURATION: 98 % | BODY MASS INDEX: 25.93 KG/M2 | DIASTOLIC BLOOD PRESSURE: 100 MMHG | WEIGHT: 202 LBS | HEIGHT: 74 IN

## 2023-09-15 DIAGNOSIS — Z53.21 PATIENT LEFT WITHOUT BEING SEEN: Primary | ICD-10-CM

## 2023-09-15 NOTE — PROGRESS NOTES
The patient left the office {Time of Visit:06707} care was provided and did not complete the visit {LWBS Reason:90696}.

## 2023-09-15 NOTE — TELEPHONE ENCOUNTER
I called pt back and let him know that me and Austin both spoke with Dr. Sousa and he would like pt to be evaluated for irregular heart rhythm and related symptoms.  He countered with all he needs is amoxicillin I explained to him again that his BP, pulse, and heart rhythm could be contributing to his symptoms of fatigue, SOB, sweating, and calf pain.  These are serious symptoms that require further work up and that Dr. Sousa cannot prescribe a medication until he knows pt is healthy enough to be on it.  Pt raised his voice to me and used profanity and I let him know that we needed to have a civil conversation or I would end the call.  I reiterated to pt again these symptoms and his vital signs today were serious and need to be evaluated whether it is here, the ER, or by cardiology. I offered him a referral to cardiology. 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  I let him know also that his blood cultures and eosinophil counts were normal as well.  I told him it is very important to be evaluated further to ensure he gets the care he needs.  He again raised his voice and used profanity so I ended the call.

## 2023-09-15 NOTE — TELEPHONE ENCOUNTER
"Hub staff attempted to follow warm transfer process and was unsuccessful     Caller: Johnson Wise    Relationship to patient: Self    Best call back number:     Patient is needing: PATIENT STATES THAT HE WAS TOLD THAT THE  WOULD BE CALLING HIM BACK TO \"TALK ABOUT SOMETHING\".  PATIENT WOULD NOT EXPOUND ON WHAT IT WAS REGARDING.  PATIENT WANTS TO BE CALLED BACK BY THE  TODAY.    [facility] WAS UNABLE TO WARM TRANSFER PATIENT CALL.      "

## 2023-09-15 NOTE — PROGRESS NOTES
The patient left the office before care was provided and did not complete the visit because of frustration with the staff/provider. Physician did not see patient.  Patient was triaged by MA during triage of vital signs MA noted that pt's pulse was 172bpm and BP was 172/100 with irregular rhythm.  MA attempted to perform EKG on pt but he refused and left the exam room.  Pt was told that the procedure is non-invasive and would not take long to perform and that it was important to get the EKG done as pt was complaining of symptoms that could be cardiac related.  Pt refused to have test administered and also refused to sign AMA form presented by .  SAFE report filed.

## 2023-09-18 ENCOUNTER — TELEPHONE (OUTPATIENT)
Dept: INTERNAL MEDICINE | Facility: CLINIC | Age: 78
End: 2023-09-18
Payer: MEDICARE

## 2023-09-18 NOTE — TELEPHONE ENCOUNTER
I called pt today and he did not answer the 1st attempt.  I called back again and he answered and I asked him if he wanted to make an appt to come back and see Dr. Sousa for an EKG, be referred to cardiology, of follow up at the ER for his high pulse and abnormal heart rhythm.  He stated he did not want anything from this office.  He reports if he needs anything he will go to [facility].  I asked him if he wanted a referral to be placed there and he states he will call back and let us know.  I advised him again that the abnormal heart rhythm should be further examined.  He declined.  Message for documentation.

## 2023-09-21 ENCOUNTER — TELEPHONE (OUTPATIENT)
Dept: INTERNAL MEDICINE | Facility: CLINIC | Age: 78
End: 2023-09-21

## 2023-09-21 NOTE — TELEPHONE ENCOUNTER
PATIENT CALLED REQUESTING ANTIBIOTIC MEDICATION. HE STATES HE WENT TO THE DENTIST AND HAD SOME MEAT IN HIS TEETH AND COULD HAVE BEEN A FALSE NEGATIVE FOR BRUCELLOSIS. HE BELIEVES THIS IS HOW HE GOT THE BURCILLUS     HE [facility] AND WAS GIVEN AMOXICILLIN AND HIS SYMPTOMS WENT AWAY. NOW THE SYMPTOMS ARE RETURNING SWEATS, HEADACHES, JOINT PAIN.    HE IS GETTING HIS TEETH CORRECTED     [id number] - [facility] [address] AT [facility] [phone] [facility] [phone]  [phone]     CALL BACK NUMBER [phone]

## 2023-09-21 NOTE — TELEPHONE ENCOUNTER
This pt LWBS on 9/15/23 after vital signs were obtained.  The MA noted his pulse was very high, elevated BP, and irregular heart rhythm.  An EKG was offered to pt but he left the office without being seen.  He has been contacted several times since to let him know he NEEDS to have this evaluated either in ER, by Dr. Sousa, or by a cardiologist and pt refused.

## 2023-09-22 ENCOUNTER — NURSE TRIAGE (OUTPATIENT)
Dept: CALL CENTER | Facility: HOSPITAL | Age: 78
End: 2023-09-22
Payer: MEDICARE

## 2023-09-22 NOTE — TELEPHONE ENCOUNTER
Pt has spoke with nurse triage.  I attempted to call pt but he did not answer.  Practice manager informed of message.  SAFE report filed and appropriate contacts made to obtain welfare check of patient.

## 2023-09-22 NOTE — TELEPHONE ENCOUNTER
PATIENT CALLED BACK NEEDING INFORMATION ABOUT WHAT HE NEEDS TO DO AND ABOUT MEDICATION    PLEASE CALL [phone]    HE IS STATING HE MIGHT TAKE 3 SWIGS OF BLEACH AND WATER TO HELP WITH HIS CONDITION.    ATTEMPTED TO WARM TRANSFER.   SENT TO NURSE TRIAGE

## 2023-09-22 NOTE — TELEPHONE ENCOUNTER
Patient requesting medication, unknown antibiotic. Has previously been instructed by Dr. Sousa's office that he will need to be seen to get the prescription. Patient upset and intends to file a complaint. Advised the patient that I could connect him with Dr. Sousa's office to schedule an appointment. Patient consented to be connected with Dr. Sousa's office. During the attempt to connect patient, patient hung up or got disconnected. Spoke with Brigido in Dr. Sousa's office and updated on situation. Brigido calling patient.    Reason for Disposition   Prescription request for new medicine (not a refill)    Additional Information   Negative: [1] Intentional drug overdose AND [2] suicidal thoughts or ideas   Negative: Drug overdose and triager unable to answer question   Negative: Caller requesting a renewal or refill of a medicine patient is currently taking   Negative: Caller requesting information unrelated to medicine   Negative: Caller requesting information about COVID-19 Vaccine   Negative: Caller requesting information about Emergency Contraception   Negative: Caller requesting information about Combined Birth Control Pills   Negative: Caller requesting information about Progestin Birth Control Pills   Negative: Caller requesting information about Post-Op pain or medicines   Negative: Caller requesting a prescription antibiotic (such as Penicillin) for Strep throat and has a positive culture result   Negative: Caller requesting a prescription anti-viral med (such as Tamiflu) and has influenza (flu) symptoms   Negative: Immunization reaction suspected   Negative: Rash while taking a medicine or within 3 days of stopping it   Negative: [1] Asthma and [2] having symptoms of asthma (cough, wheezing, etc.)   Negative: [1] Symptom of illness (e.g., headache, abdominal pain, earache, vomiting) AND [2] more than mild   Negative: Breastfeeding questions about mother's medicines and diet   Negative: MORE THAN A  "DOUBLE DOSE of a prescription or over-the-counter (OTC) drug   Negative: [1] DOUBLE DOSE (an extra dose or lesser amount) of prescription drug AND [2] any symptoms (e.g., dizziness, nausea, pain, sleepiness)   Negative: [1] DOUBLE DOSE (an extra dose or lesser amount) of over-the-counter (OTC) drug AND [2] any symptoms (e.g., dizziness, nausea, pain, sleepiness)   Negative: Took another person's prescription drug   Negative: [1] DOUBLE DOSE (an extra dose or lesser amount) of prescription drug AND [2] NO symptoms  (Exception: A double dose of antibiotics.)   Negative: Diabetes drug error or overdose (e.g., took wrong type of insulin or took extra dose)   Negative: [1] Prescription not at pharmacy AND [2] was prescribed by PCP recently (Exception: Triager has access to EMR and prescription is recorded there. Go to Home Care and confirm for pharmacy.)   Negative: [1] Pharmacy calling with prescription question AND [2] triager unable to answer question   Negative: [1] Caller has URGENT medicine question about med that PCP or specialist prescribed AND [2] triager unable to answer question   Negative: Medicine patch causing local rash or itching   Negative: [1] Caller has medicine question about med NOT prescribed by PCP AND [2] triager unable to answer question (e.g., compatibility with other med, storage)    Answer Assessment - Initial Assessment Questions  1. NAME of MEDICINE: \"What medicine(s) are you calling about?\"      Unknown antibiotic  2. QUESTION: \"What is your question?\" (e.g., double dose of medicine, side effect)      Requesting prescription, previously discussed with PCP per patient  3. PRESCRIBER: \"Who prescribed the medicine?\" Reason: if prescribed by specialist, call should be referred to that group.      NA  4. SYMPTOMS: \"Do you have any symptoms?\" If Yes, ask: \"What symptoms are you having?\"  \"How bad are the symptoms (e.g., mild, moderate, severe)      Joint pain  5. PREGNANCY:  \"Is there any chance " "that you are pregnant?\" \"When was your last menstrual period?\"      NA    Protocols used: Medication Question Call-ADULT-    "

## 2023-09-27 NOTE — TELEPHONE ENCOUNTER
At this point with this patient, it is unclear whether he still wants me to be his primary care with him not being compliant.

## 2025-03-01 NOTE — TELEPHONE ENCOUNTER
Pt called asking if Dr Carlson will tie up his hernia. He's scheduled as a new pt on 6/15 @ 1:45 pm. I did inform the pt that she'd more than likely want to see him before making any decision but that I'd send her a message & call him when she responds.    0 (no pain/absence of nonverbal indicators of pain)